# Patient Record
Sex: MALE | Race: WHITE | Employment: STUDENT | URBAN - NONMETROPOLITAN AREA
[De-identification: names, ages, dates, MRNs, and addresses within clinical notes are randomized per-mention and may not be internally consistent; named-entity substitution may affect disease eponyms.]

---
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
## 2021-03-03 ENCOUNTER — OFFICE VISIT (OUTPATIENT)
Dept: OPTOMETRY | Age: 21
End: 2021-03-03
Payer: COMMERCIAL

## 2021-03-03 DIAGNOSIS — H52.13 MYOPIA OF BOTH EYES: ICD-10-CM

## 2021-03-03 PROCEDURE — 92015 DETERMINE REFRACTIVE STATE: CPT | Performed by: OPTOMETRIST

## 2021-03-03 PROCEDURE — 92014 COMPRE OPH EXAM EST PT 1/>: CPT | Performed by: OPTOMETRIST

## 2021-03-03 PROCEDURE — 92310 CONTACT LENS FITTING OU: CPT | Performed by: OPTOMETRIST

## 2021-03-03 ASSESSMENT — REFRACTION_MANIFEST
OD_CYLINDER: -0.50
OD_SPHERE: -4.50
OS_CYLINDER: DS
OS_SPHERE: -5.50
OD_AXIS: 115

## 2021-03-03 ASSESSMENT — REFRACTION_WEARINGRX
OS_SPHERE: -5.00
OS_CYLINDER: DS
OD_SPHERE: -4.25
OD_AXIS: 112
OD_CYLINDER: -0.25
SPECS_TYPE: SVL

## 2021-03-03 ASSESSMENT — TONOMETRY
IOP_METHOD: NON-CONTACT AIR PUFF
OD_IOP_MMHG: 20
OS_IOP_MMHG: 20

## 2021-03-03 ASSESSMENT — KERATOMETRY
OD_AXISANGLE2_DEGREES: 143
METHOD_AUTO_MANUAL: AUTO
OD_K2POWER_DIOPTERS: 42.25
OD_K1POWER_DIOPTERS: 42.00
OD_AXISANGLE_DEGREES: 53
OS_K1POWER_DIOPTERS: 42.50
OS_K2POWER_DIOPTERS: 42.50
OS_AXISANGLE2_DEGREES: 0
OS_AXISANGLE_DEGREES: 90

## 2021-03-03 ASSESSMENT — VISUAL ACUITY
OS_CC: 20/20
OD_PH_CC: 20/20 OU
CORRECTION_TYPE: CONTACTS
OD_CC+: -1
OD_CC: 20/20 OU
METHOD: SNELLEN - LINEAR

## 2021-03-03 ASSESSMENT — SLIT LAMP EXAM - LIDS
COMMENTS: NORMAL
COMMENTS: NORMAL

## 2021-03-03 ASSESSMENT — REFRACTION_CURRENTRX
OS_BASECURVE: 8.4
OS_BRAND: VISTAKON: ACUVUE OASYS
OD_BASECURVE: 8.4
OD_SPHERE: -4.00
OD_BRAND: VISTAKON: ACUVUE OASYS
OS_SPHERE: -4.75

## 2021-03-03 ASSESSMENT — ENCOUNTER SYMPTOMS
RESPIRATORY NEGATIVE: 0
GASTROINTESTINAL NEGATIVE: 0
EYES NEGATIVE: 0
ALLERGIC/IMMUNOLOGIC NEGATIVE: 0

## 2021-03-03 NOTE — PATIENT INSTRUCTIONS
New glasses rx printed    New contact lens trials given    Order 1 box of 12 for each;  Use insurance towards the supply

## 2021-03-03 NOTE — PROGRESS NOTES
Main Ophthalmology Exam     External Exam       Right Left    External Normal Normal          Slit Lamp Exam       Right Left    Lids/Lashes Normal Normal    Conjunctiva/Sclera White and quiet White and quiet    Cornea Clear Clear    Anterior Chamber Deep and quiet Deep and quiet    Iris Round and reactive Round and reactive    Lens Clear Clear    Vitreous Normal Normal          Fundus Exam       Right Left    Disc Normal Normal    C/D Ratio 0.15 0.15    Macula Normal Normal    Vessels Normal Normal                  Tonometry     Tonometry (Non-contact air puff, 4:40 PM)       Right Left    Pressure 20 20   IOP.6             19.0  CH:  8.5          9.2  WS: 8.1          5.8                     Visual Acuity (Snellen - Linear)       Right Left    Dist cc 20/20 -1 20/20    Dist ph cc 20/20 OU     Near cc 20/20 OU     Correction: Contacts        Keratometry     Keratometry (Auto)       K1 Axis K2 Axis    Right 42.00 143 42.25 53    Left 42.50 0 42.50 90                Ophthalmology Exam     Wearing Rx       Sphere Cylinder Axis    Right -4.25 -0.25 112    Left -5.00 ds     Type: SVL          Wearing Rx #2       Sphere Cylinder Axis    Right -4.25 -0.25 112    Left -5.00 ds     Type: SVL                Manifest Refraction     Manifest Refraction       Sphere Cylinder [place] Dist VA    Right -4.50 -0.50 115 20/20    Left -5.50 ds  20/20          Manifest Refraction #2 (Auto)       Sphere Cylinder [place] Dist VA    Right -4.50 -0.50 117     Left -5.25 -0.25 053                  Final Rx       Sphere Cylinder Axis    Right -4.50 -0.50 115    Left -5.50 ds     Type: SVL    Expiration Date: 3/4/2023           Contact Lens Current Rx     Current Contact Lens Rx       Brand Base Curve Sphere    Right [place]: Acuvue Oasys 8.4 -4.00    Left [place]: Acuvue Oasys 8.4 -4.75                Final   Final Contact Lens Rx       Brand Base Curve Sphere    Right [place]: Acuvue Oasys 8.4 -4.25    Left [place]: Acuvue Oasys 8. 4 -5.00           IMPRESSION:  1. Myopia of both eyes        PLAN:    1.  New glasses rx printed;  New contact lenses will be ordered ; trials given       Patient Instructions   New glasses rx printed    New contact lens trials given    Order 1 box of 12 for each;  Use insurance towards the supply      Return in about 1 year (around 3/3/2022) for complete eye exam.

## 2022-09-27 ENCOUNTER — OFFICE VISIT (OUTPATIENT)
Dept: OPTOMETRY | Age: 22
End: 2022-09-27
Payer: COMMERCIAL

## 2022-09-27 DIAGNOSIS — H52.13 MYOPIA OF BOTH EYES WITH ASTIGMATISM: Primary | ICD-10-CM

## 2022-09-27 DIAGNOSIS — H52.203 MYOPIA OF BOTH EYES WITH ASTIGMATISM: Primary | ICD-10-CM

## 2022-09-27 PROCEDURE — 92310 CONTACT LENS FITTING OU: CPT | Performed by: OPTOMETRIST

## 2022-09-27 PROCEDURE — 92015 DETERMINE REFRACTIVE STATE: CPT | Performed by: OPTOMETRIST

## 2022-09-27 PROCEDURE — 92014 COMPRE OPH EXAM EST PT 1/>: CPT | Performed by: OPTOMETRIST

## 2022-09-27 RX ORDER — DEXTROAMPHETAMINE SACCHARATE, AMPHETAMINE ASPARTATE MONOHYDRATE, DEXTROAMPHETAMINE SULFATE AND AMPHETAMINE SULFATE 3.75; 3.75; 3.75; 3.75 MG/1; MG/1; MG/1; MG/1
15 CAPSULE, EXTENDED RELEASE ORAL DAILY
COMMUNITY
Start: 2022-08-30 | End: 2022-09-29

## 2022-09-27 ASSESSMENT — VISUAL ACUITY
CORRECTION_TYPE: CONTACTS
METHOD: SNELLEN - LINEAR
OD_PH_CC: 20/20 OU
OS_CC: 20/20

## 2022-09-27 ASSESSMENT — KERATOMETRY
OS_K2POWER_DIOPTERS: 43.00
OD_AXISANGLE2_DEGREES: 0
OS_AXISANGLE_DEGREES: 55
OD_K2POWER_DIOPTERS: 42.25
OD_K1POWER_DIOPTERS: 42.25
OS_K1POWER_DIOPTERS: 42.50
OD_AXISANGLE_DEGREES: 90
OS_AXISANGLE2_DEGREES: 145
METHOD_AUTO_MANUAL: AUTOMATED

## 2022-09-27 ASSESSMENT — REFRACTION_CURRENTRX
OS_BASECURVE: 8.4
OS_SPHERE: -5.00
OD_BRAND: VISTAKON: ACUVUE OASYS
OD_BASECURVE: 8.4
OD_SPHERE: -4.25
OS_BRAND: VISTAKON: ACUVUE OASYS

## 2022-09-27 ASSESSMENT — TONOMETRY
OD_IOP_MMHG: 19
OS_IOP_MMHG: 18
IOP_METHOD: NON-CONTACT AIR PUFF

## 2022-09-27 ASSESSMENT — REFRACTION_MANIFEST
OD_SPHERE: -4.75
OD_CYLINDER: -0.50
OS_SPHERE: -5.75
OD_AXIS: 115
OS_CYLINDER: DS

## 2022-09-27 ASSESSMENT — SLIT LAMP EXAM - LIDS
COMMENTS: NORMAL
COMMENTS: NORMAL

## 2022-09-27 ASSESSMENT — ENCOUNTER SYMPTOMS
ALLERGIC/IMMUNOLOGIC NEGATIVE: 0
EYES NEGATIVE: 0
GASTROINTESTINAL NEGATIVE: 0
RESPIRATORY NEGATIVE: 0

## 2022-09-27 ASSESSMENT — REFRACTION_WEARINGRX
OD_AXIS: 115
OD_CYLINDER: -0.50
OD_SPHERE: -4.50
SPECS_TYPE: SVL
OS_CYLINDER: DS
OS_SPHERE: -5.50

## 2022-09-27 NOTE — PROGRESS NOTES
Carlin Rodriguez presents today for   Chief Complaint   Patient presents with    Vision Exam     Last Vision Exam: 3/3/2021  Last Ophthalmology Exam: na  Last Filled Glasses Rx: 3+ years ago. [facility]  Update:  Exam and contacts. [facility]       Vision Exam              Comments: Last Vision Exam: 3/3/2021  Last Ophthalmology Exam: na  Last Filled Glasses Rx: 3+ years ago. Insurance:  [employer]  Update:  Exam and contacts. Comments    This pair if old;  throws away every couple weeks normally  Vision is good with the contact lenses  Has backup glasses                        ROS    Negative for: Constitutional, Gastrointestinal, Neurological, Skin, Genitourinary, Musculoskeletal, HENT, Endocrine, Cardiovascular, Eyes, Respiratory, Psychiatric, Allergic/Imm, Heme/Lymph         Family History   Problem Relation Age of Onset    Glaucoma Neg Hx     Diabetes Neg Hx     Cataracts Neg Hx        Social History     Socioeconomic History    Marital status: Single     Spouse name: None    Number of children: None    Years of education: None    Highest education level: None   Tobacco Use    Smoking status: Never    Smokeless tobacco: Never     History reviewed. No pertinent past medical history.     Neuro/Psych       Neuro/Psych       Oriented x3: Yes    Mood/Affect: Normal                    Not recorded       Tonometry       Tonometry (Non-contact air puff, 2:45 PM)         Right Left    Pressure 19 18   IOP.8             16.7  CH:  9.4          9.8  WS: 9.1          8.6                       Visual Acuity (Snellen - Linear)         Right Left    Dist cc 20/20 20/20    Dist ph cc 20/20 OU     Near cc 20/20 OUI       Correction: Contacts          Keratometry       Keratometry (Automated)         K1 Axis K2 Axis    Right 42.25 0 42.25 90    Left 42.50 145 43.00 55                    Ophthalmology Exam       Wearing Rx         Sphere Cylinder Axis    Right -4.50 -0.50 115    Left -5.50 ds Type: SVL                    Manifest Refraction       Manifest Refraction         Sphere Cylinder Axis Dist VA    Right -4.75 -0.50 115 20/20    Left -5.75 ds  20/20              Manifest Refraction #2 (Auto)         Sphere Cylinder [place] Dist VA    Right -5.00 -0.25 116     Left -6.25 0.00 000                      Final Rx         Sphere Cylinder Axis Dist VA    Right -4.75 -0.50 115 20/20    Left -5.75 ds  20/20      Type: SVL    Expiration Date: 9/27/2024             Contact Lens Current Rx       Current Contact Lens Rx         Brand Base Curve Sphere    Right [place]: Acuvue Oasys 8.4 -4.25    Left [place]: Acuvue Oasys 8.4 -5.00                    Final   Final Contact Lens Rx         Brand Base Curve Sphere    Right [place]: Acuvue Oasys 8.4 -4.25    Left [place]: Acuvue Oasys 8.4 -5.00      Expiration Date: 9/28/2023    Wearing Schedule: Daily wear             IMPRESSION:  1. Myopia of both eyes with astigmatism [H52.13, H52.203 (ICD-10-CM)]        PLAN:    New contact lens trials given ;  billed fit thru insurance as desired     2 boxes will be ordered   There are no Patient Instructions on file for this visit.    Return in about 1 year (around 9/27/2023) for complete eye exam.